# Patient Record
Sex: MALE | Race: WHITE | Employment: FULL TIME | URBAN - METROPOLITAN AREA
[De-identification: names, ages, dates, MRNs, and addresses within clinical notes are randomized per-mention and may not be internally consistent; named-entity substitution may affect disease eponyms.]

---

## 2022-12-09 ENCOUNTER — HOSPITAL ENCOUNTER (EMERGENCY)
Age: 32
Discharge: HOME | End: 2022-12-09
Attending: PHYSICIAN ASSISTANT

## 2022-12-09 ENCOUNTER — APPOINTMENT (OUTPATIENT)
Dept: VASCULAR SURGERY | Age: 32
End: 2022-12-09
Attending: PHYSICIAN ASSISTANT

## 2022-12-09 VITALS
BODY MASS INDEX: 34.72 KG/M2 | WEIGHT: 248 LBS | SYSTOLIC BLOOD PRESSURE: 141 MMHG | HEART RATE: 83 BPM | DIASTOLIC BLOOD PRESSURE: 89 MMHG | HEIGHT: 71 IN | RESPIRATION RATE: 16 BRPM | OXYGEN SATURATION: 99 % | TEMPERATURE: 97.4 F

## 2022-12-09 DIAGNOSIS — R23.3 PETECHIAL RASH: Primary | ICD-10-CM

## 2022-12-09 LAB
ALBUMIN SERPL-MCNC: 3.8 G/DL (ref 3.4–5)
ALBUMIN/GLOB SERPL: 1 {RATIO} (ref 0.8–1.7)
ALP SERPL-CCNC: 108 U/L (ref 45–117)
ALT SERPL-CCNC: 24 U/L (ref 16–61)
ANION GAP SERPL CALC-SCNC: 4 MMOL/L (ref 3–18)
APTT PPP: 52.4 SEC (ref 23–36.4)
AST SERPL-CCNC: 16 U/L (ref 10–38)
BASOPHILS # BLD: 0.1 K/UL (ref 0–0.1)
BASOPHILS NFR BLD: 1 % (ref 0–2)
BILIRUB SERPL-MCNC: 2 MG/DL (ref 0.2–1)
BUN SERPL-MCNC: 14 MG/DL (ref 7–18)
BUN/CREAT SERPL: 12 (ref 12–20)
CALCIUM SERPL-MCNC: 9.1 MG/DL (ref 8.5–10.1)
CHLORIDE SERPL-SCNC: 105 MMOL/L (ref 100–111)
CO2 SERPL-SCNC: 30 MMOL/L (ref 21–32)
CREAT SERPL-MCNC: 1.17 MG/DL (ref 0.6–1.3)
D DIMER PPP FEU-MCNC: 0.77 UG/ML(FEU)
DIFFERENTIAL METHOD BLD: ABNORMAL
EOSINOPHIL # BLD: 0.1 K/UL (ref 0–0.4)
EOSINOPHIL NFR BLD: 1 % (ref 0–5)
ERYTHROCYTE [DISTWIDTH] IN BLOOD BY AUTOMATED COUNT: 12.9 % (ref 11.6–14.5)
GLOBULIN SER CALC-MCNC: 3.9 G/DL (ref 2–4)
GLUCOSE SERPL-MCNC: 104 MG/DL (ref 74–99)
HCT VFR BLD AUTO: 45.9 % (ref 36–48)
HGB BLD-MCNC: 15.5 G/DL (ref 13–16)
IMM GRANULOCYTES # BLD AUTO: 0.1 K/UL (ref 0–0.04)
IMM GRANULOCYTES NFR BLD AUTO: 0 % (ref 0–0.5)
INR PPP: 1 (ref 0.8–1.2)
LYMPHOCYTES # BLD: 3 K/UL (ref 0.9–3.6)
LYMPHOCYTES NFR BLD: 21 % (ref 21–52)
MCH RBC QN AUTO: 30.2 PG (ref 24–34)
MCHC RBC AUTO-ENTMCNC: 33.8 G/DL (ref 31–37)
MCV RBC AUTO: 89.3 FL (ref 78–100)
MONOCYTES # BLD: 1.1 K/UL (ref 0.05–1.2)
MONOCYTES NFR BLD: 8 % (ref 3–10)
NEUTS SEG # BLD: 9.6 K/UL (ref 1.8–8)
NEUTS SEG NFR BLD: 69 % (ref 40–73)
NRBC # BLD: 0 K/UL (ref 0–0.01)
NRBC BLD-RTO: 0 PER 100 WBC
PLATELET # BLD AUTO: 230 K/UL (ref 135–420)
PMV BLD AUTO: 11 FL (ref 9.2–11.8)
POTASSIUM SERPL-SCNC: 3.7 MMOL/L (ref 3.5–5.5)
PROT SERPL-MCNC: 7.7 G/DL (ref 6.4–8.2)
PROTHROMBIN TIME: 13.2 SEC (ref 11.5–15.2)
RBC # BLD AUTO: 5.14 M/UL (ref 4.35–5.65)
SODIUM SERPL-SCNC: 139 MMOL/L (ref 136–145)
WBC # BLD AUTO: 14 K/UL (ref 4.6–13.2)

## 2022-12-09 PROCEDURE — 85730 THROMBOPLASTIN TIME PARTIAL: CPT

## 2022-12-09 PROCEDURE — 85025 COMPLETE CBC W/AUTO DIFF WBC: CPT

## 2022-12-09 PROCEDURE — 93970 EXTREMITY STUDY: CPT

## 2022-12-09 PROCEDURE — 80053 COMPREHEN METABOLIC PANEL: CPT

## 2022-12-09 PROCEDURE — 99284 EMERGENCY DEPT VISIT MOD MDM: CPT

## 2022-12-09 PROCEDURE — 85379 FIBRIN DEGRADATION QUANT: CPT

## 2022-12-09 PROCEDURE — 85610 PROTHROMBIN TIME: CPT

## 2022-12-09 RX ORDER — METHYLPREDNISOLONE 4 MG/1
TABLET ORAL
Qty: 1 DOSE PACK | Refills: 0 | Status: SHIPPED | OUTPATIENT
Start: 2022-12-09

## 2022-12-09 NOTE — ED TRIAGE NOTES
Pt c/o left leg pain that started this morning and has difficulty bearing weight on the left leg. Pt also reports pain is starting to increase in right leg. Pt presents with a patchy red rash to bilat lower extremities. Pt denies itching.

## 2022-12-09 NOTE — Clinical Note
[address] [facility] EMERGENCY DEPT  [address] [address]  [phone]    Work/School Note    Date: 12/9/2022    To Whom It May concern:      Nick Daniels was seen and treated today in the emergency room by the following provider(s):  Attending Provider: Carlos Barney MD  Physician Assistant: Lucy García PA-C. Nick Daniels is excused from work/school on 12/09/22. He is clear to return to work/school on 12/10/22.         Sincerely,          Matt Davila PA-C

## 2022-12-09 NOTE — ED PROVIDER NOTES
24-year-old male presents to the emergency department with acute onset of bilateral leg pain and painful rash. 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 He woke this afternoon around 2 PM with onset of painful rash to bilateral lower legs. Left worse than right. He denies injury or trauma, staes the lesions are not pruritic. He reports associated swelling, denies chest pain abdominal pain, shortness of breath, nausea or vomiting. No fevers or chills. He has not taken any over-the-counter medications. Patient reports \"heavy drinking\" last evening, had \"4 or 5 tall boys\". States he does drink several times a week, but not every day. Leg Pain        No past medical history on file. No past surgical history on file. No family history on file. Smoker: tobacco  Etoh: several times weekly, beer        ALLERGIES: Percocet [oxycodone-acetaminophen]    Review of Systems  Constitutional:  Denies malaise, fever, chills. Head:  Denies injury. Face:  Denies injury or pain. ENMT:  Denies sore throat, congestion. Chest:  Denies injury. Cardiac:  Denies chest pain or palpitations. Respiratory:  Denies cough, wheezing, difficulty breathing, shortness of breath. GI/ABD:  Denies injury, pain, distention, nausea, vomiting, diarrhea. :  Denies injury, pain, dysuria or urgency. MSK: Bilateral leg pain and swelling  Neuro:  Denies headache, LOC, dizziness, tingling or numbness  Skin: Painful rash    Vitals:    12/09/22 1505   BP: (!) 141/89   Pulse: 83   Resp: 16   Temp: 97.4 °F (36.3 °C)   SpO2: 99%   Weight: 112.5 kg (248 lb)   Height: 5' 11\" (1.803 m)            Physical Exam   CONSTITUTIONAL:  Alert, in no apparent distress;  well developed;  well nourished. HEAD:  Normocephalic, atraumatic. EYES: Non-icteric sclera. Normal conjunctiva. ENTM:  Nose:  no rhinorrhea. Throat:  no erythema or exudate, mucous membranes moist.  NECK:  No JVD.   Supple  RESPIRATORY:  Chest clear, equal breath sounds, good air movement. CARDIOVASCULAR:  Regular rate and rhythm. No murmurs, rubs, or gallops. GI:  Normal bowel sounds, abdomen soft and non-tender. No rebound or guarding. LOWER EXT: no pitting edema bilaterally, but mild swelling at ankles noted. No calf TTP, negative Homans. DP pulses intact and equal, cap refill intact  NEURO:  Moves all four extremities, sensation intact to light touch  SKIN: non-blanching petechia to BLE more prevalent in boot/sock distribution. TTP, no vesicles, pustules or underlying erythema. [place] of petechia along lateral proximal left foot. PSYCH:  Alert and normal affect. Recent Results (from the past 12 hour(s))   METABOLIC PANEL, COMPREHENSIVE    Collection Time: 12/09/22  4:31 PM   Result Value Ref Range    Sodium 139 136 - 145 mmol/L    Potassium 3.7 3.5 - 5.5 mmol/L    Chloride 105 100 - 111 mmol/L    CO2 30 21 - 32 mmol/L    Anion gap 4 3.0 - 18 mmol/L    Glucose 104 (H) 74 - 99 mg/dL    BUN 14 7.0 - 18 MG/DL    Creatinine 1.17 0.6 - 1.3 MG/DL    BUN/Creatinine ratio 12 12 - 20      eGFR >60 >60 ml/min/1.73m2    Calcium 9.1 8.5 - 10.1 MG/DL    Bilirubin, total 2.0 (H) 0.2 - 1.0 MG/DL    ALT (SGPT) 24 16 - 61 U/L    AST (SGOT) 16 10 - 38 U/L    Alk.  phosphatase 108 45 - 117 U/L    Protein, total 7.7 6.4 - 8.2 g/dL    Albumin 3.8 3.4 - 5.0 g/dL    Globulin 3.9 2.0 - 4.0 g/dL    A-G Ratio 1.0 0.8 - 1.7     CBC WITH AUTOMATED DIFF    Collection Time: 12/09/22  4:31 PM   Result Value Ref Range    WBC 14.0 (H) 4.6 - 13.2 K/uL    RBC 5.14 4.35 - 5.65 M/uL    HGB 15.5 13.0 - 16.0 g/dL    HCT 45.9 36.0 - 48.0 %    MCV 89.3 78.0 - 100.0 FL    MCH 30.2 24.0 - 34.0 PG    MCHC 33.8 31.0 - 37.0 g/dL    RDW 12.9 11.6 - 14.5 %    PLATELET 748 975 - 409 K/uL    MPV 11.0 9.2 - 11.8 FL    NRBC 0.0 0  WBC    ABSOLUTE NRBC 0.00 0.00 - 0.01 K/uL    NEUTROPHILS 69 40 - 73 %    LYMPHOCYTES 21 21 - 52 %    MONOCYTES 8 3 - 10 %    EOSINOPHILS 1 0 - 5 % BASOPHILS 1 0 - 2 %    IMMATURE GRANULOCYTES 0 0.0 - 0.5 %    ABS. NEUTROPHILS 9.6 (H) 1.8 - 8.0 K/UL    ABS. LYMPHOCYTES 3.0 0.9 - 3.6 K/UL    ABS. MONOCYTES 1.1 0.05 - 1.2 K/UL    ABS. EOSINOPHILS 0.1 0.0 - 0.4 K/UL    ABS. BASOPHILS 0.1 0.0 - 0.1 K/UL    ABS. IMM. GRANS. 0.1 (H) 0.00 - 0.04 K/UL    DF AUTOMATED     D DIMER    Collection Time: 12/09/22  4:31 PM   Result Value Ref Range    D DIMER 0.77 (H) <0.46 ug/ml(FEU)   PROTHROMBIN TIME + INR    Collection Time: 12/09/22  4:31 PM   Result Value Ref Range    Prothrombin time 13.2 11.5 - 15.2 sec    INR 1.0 0.8 - 1.2     PTT    Collection Time: 12/09/22  4:31 PM   Result Value Ref Range    aPTT 52.4 (H) 23.0 - 36.4 SEC         MDM  Number of Diagnoses or Management Options  Petechial rash  Diagnosis management comments: Nonblanching petechial rash to bilateral lower extremities mostly in distribution of socks and work boots, however a few additional scattered lesions extend up the leg proximally. Bilateral legs are tender to the touch, with mild edema, nonpitting. He does report a history of heavy alcohol consumption and has right upper quadrant tenderness on exam.  Will obtain CBC CMP coags and D-dimer. Differential diagnosis including coagulopathy, DIC, ITP. Ddimer elevated, PVL studies obtained, also negative. Attempted to contact hem/onc multiple times without success. Results reviewed with Dr. Isaiah Reid, who also saw and evaluated patient, who directed safe to dispo home and medrol dosepack. At this point, source of petechia unclear. VSS, he is well apearing, no fever, no other systemic complaints. No further testing indicated at this time. Recommend return tomorrow if rash worsens. Also recommend derm follow up. Patient expressed understanding and agrees. Procedures  Bilateral LE PVLs: negative       Diagnosis:   1.  Petechial rash          Disposition: discharged    Follow-up Information       Follow up With Specialties Details Why [facility] EMERGENCY DEPT Emergency Medicine In 1 day If symptoms worsen [address]    Gabe Connolly MD Dermatology Physician   [address] [facility]  [id number] [address]  [phone]              Discharge Medication List as of 12/9/2022  9:22 PM        START taking these medications    Details   methylPREDNISolone (Medrol, Beka,) 4 mg tablet Take as directed, Normal, Disp-1 Dose Pack, R-0           Belkys Almendarez PA-C

## 2022-12-09 NOTE — Clinical Note
[address] [facility] EMERGENCY DEPT  [address] [address]  [phone]    Work/School Note    Date: 12/9/2022    To Whom It May concern:      Ina Yoder was seen and treated today in the emergency room by the following provider(s):  Attending Provider: Musa Schmitt DO  Physician Assistant: Pavan Mai PA-C. Ina Yoder is excused from work/school on 12/09/22. He is clear to return to work/school on 12/10/22.         Sincerely,          Flower Tomlinson PA-C

## 2022-12-10 NOTE — DISCHARGE INSTRUCTIONS
Benadryl as needed if itching develops. Take medrol dose pack as prescribed. Return to ER if symptoms worsen. You may follow up with dermatology as well if symptoms do not resolve.